# Patient Record
Sex: MALE | Race: WHITE | NOT HISPANIC OR LATINO | Employment: UNEMPLOYED | URBAN - NONMETROPOLITAN AREA
[De-identification: names, ages, dates, MRNs, and addresses within clinical notes are randomized per-mention and may not be internally consistent; named-entity substitution may affect disease eponyms.]

---

## 2023-09-13 ENCOUNTER — OFFICE VISIT (OUTPATIENT)
Dept: SURGERY | Facility: OTHER | Age: 43
End: 2023-09-13
Attending: SURGERY
Payer: COMMERCIAL

## 2023-09-13 VITALS
DIASTOLIC BLOOD PRESSURE: 84 MMHG | HEART RATE: 73 BPM | SYSTOLIC BLOOD PRESSURE: 128 MMHG | TEMPERATURE: 97.3 F | OXYGEN SATURATION: 97 % | WEIGHT: 315 LBS | RESPIRATION RATE: 20 BRPM | HEIGHT: 77 IN | BODY MASS INDEX: 37.19 KG/M2

## 2023-09-13 DIAGNOSIS — L60.0 INGROWN NAIL OF GREAT TOE OF LEFT FOOT: Primary | ICD-10-CM

## 2023-09-13 PROCEDURE — 11730 AVULSION NAIL PLATE SIMPLE 1: CPT | Mod: TA | Performed by: SURGERY

## 2023-09-13 ASSESSMENT — ANXIETY QUESTIONNAIRES
6. BECOMING EASILY ANNOYED OR IRRITABLE: NOT AT ALL
3. WORRYING TOO MUCH ABOUT DIFFERENT THINGS: NOT AT ALL
2. NOT BEING ABLE TO STOP OR CONTROL WORRYING: NOT AT ALL
7. FEELING AFRAID AS IF SOMETHING AWFUL MIGHT HAPPEN: NOT AT ALL
GAD7 TOTAL SCORE: 0
GAD7 TOTAL SCORE: 0
1. FEELING NERVOUS, ANXIOUS, OR ON EDGE: NOT AT ALL
5. BEING SO RESTLESS THAT IT IS HARD TO SIT STILL: NOT AT ALL

## 2023-09-13 ASSESSMENT — PAIN SCALES - GENERAL: PAINLEVEL: NO PAIN (0)

## 2023-09-13 ASSESSMENT — PATIENT HEALTH QUESTIONNAIRE - PHQ9: 5. POOR APPETITE OR OVEREATING: NOT AT ALL

## 2023-09-13 NOTE — PROGRESS NOTES
Procedure Note      Pre/Post Operative Diagnosis:   Left 1st toe ingrown toenail     Procedure:   Removal of Left 1st toe ingrown toenail      Surgeon: Chico Torre MD    Local Anesthesia: 1% lidocaine with 0.25% Marcaine with epinephrine    Indication for the procedure:  This is a 43 year old male  patient referred by self      After explaining the risks to include bleeding, infection, recurrence or need for reexcision, and scarring the patient wished to proceed.    Procedure:   The area was prepped and draped in usual sterile fashion with ChloraPrep.   After, adequate local anesthesia with a ring block and a local infiltration, we proceeded to undermine the lateral one fourth of the first toenail.  We then divided it with a nipper.  We then proceeded to avulse it with traction.  We treated the base of the germinal matrix on the lateral aspect with phenol.  We then applied bacitracin Xeroform and gauze and mild compression with Coban.    Plan:  Avoid soaking.  Change of bandage daily to every other day as needed.  Patient will follow up if there any problems with the wound including redness or drainage.      Chico Torre MD....................  9/13/2023   10:26 AM

## 2023-09-13 NOTE — NURSING NOTE
"Chief Complaint   Patient presents with    Derm Problem     Here today with a ingrown toenail on left foot.       Initial /84 (BP Location: Left arm, Patient Position: Sitting, Cuff Size: Adult Large)   Pulse 73   Temp 97.3  F (36.3  C) (Tympanic)   Resp 20   Ht 1.956 m (6' 5\")   Wt (!) 154.8 kg (341 lb 3.2 oz)   SpO2 97%   BMI 40.46 kg/m   Estimated body mass index is 40.46 kg/m  as calculated from the following:    Height as of this encounter: 1.956 m (6' 5\").    Weight as of this encounter: 154.8 kg (341 lb 3.2 oz).  Medication Reconciliation: complete    Adela Fish LPN      TIMEOUT  [facility] Protocol    A. Pre-procedure verification complete yes  1-relevant information / documentation available, reviewed and properly matched to the patient; 2-consent accurate and complete, 3-equipment and supplies available    B. Site marking complete Yes  Site marked if not in continuous attendance with patient    C. TIME OUT completed yes  Time Out was conducted just prior to starting procedure to verify the eight required elements: 1-patient identity, 2-consent accurate and complete, 3-position, 4-correct side/site marked (if applicable), 5-procedure, 6-relevant images / results properly labeled and displayed (if applicable), 7-antibiotics / irrigation fluids (if applicable), 8-safety precautions.   "

## 2023-09-13 NOTE — NURSING NOTE
"Chief Complaint   Patient presents with    Derm Problem     Here today with a ingrown toenail on left foot.       Initial /84 (BP Location: Left arm, Patient Position: Sitting, Cuff Size: Adult Large)   Pulse 73   Temp 97.3  F (36.3  C) (Tympanic)   Resp 20   Ht 1.956 m (6' 5\")   Wt (!) 154.8 kg (341 lb 3.2 oz)   SpO2 97%   BMI 40.46 kg/m   Estimated body mass index is 40.46 kg/m  as calculated from the following:    Height as of this encounter: 1.956 m (6' 5\").    Weight as of this encounter: 154.8 kg (341 lb 3.2 oz).  Medication Reconciliation: complete    Adela Fish LPN    "